# Patient Record
Sex: MALE | Race: WHITE | Employment: FULL TIME | URBAN - METROPOLITAN AREA
[De-identification: names, ages, dates, MRNs, and addresses within clinical notes are randomized per-mention and may not be internally consistent; named-entity substitution may affect disease eponyms.]

---

## 2024-01-10 ENCOUNTER — APPOINTMENT (OUTPATIENT)
Dept: CT IMAGING | Facility: HOSPITAL | Age: 44
End: 2024-01-10
Attending: EMERGENCY MEDICINE
Payer: COMMERCIAL

## 2024-01-10 ENCOUNTER — HOSPITAL ENCOUNTER (EMERGENCY)
Facility: HOSPITAL | Age: 44
Discharge: HOME | End: 2024-01-10
Attending: EMERGENCY MEDICINE
Payer: COMMERCIAL

## 2024-01-10 VITALS
HEART RATE: 90 BPM | OXYGEN SATURATION: 100 % | DIASTOLIC BLOOD PRESSURE: 94 MMHG | RESPIRATION RATE: 15 BRPM | SYSTOLIC BLOOD PRESSURE: 137 MMHG | BODY MASS INDEX: 28.2 KG/M2 | HEIGHT: 73 IN | TEMPERATURE: 97 F | WEIGHT: 212.81 LBS

## 2024-01-10 DIAGNOSIS — N20.0 KIDNEY STONE: Primary | ICD-10-CM

## 2024-01-10 LAB
ALBUMIN SERPL-MCNC: 4.6 G/DL (ref 3.4–5)
ALBUMIN/GLOB SERPL: 1.3 {RATIO} (ref 1–2)
ALP LIVER SERPL-CCNC: 91 U/L
ALT SERPL-CCNC: 39 U/L
ANION GAP SERPL CALC-SCNC: 7 MMOL/L (ref 0–18)
AST SERPL-CCNC: 20 U/L (ref 15–37)
BASOPHILS # BLD AUTO: 0.02 X10(3) UL (ref 0–0.2)
BASOPHILS NFR BLD AUTO: 0.3 %
BILIRUB SERPL-MCNC: 0.6 MG/DL (ref 0.1–2)
BILIRUB UR QL STRIP.AUTO: NEGATIVE
BUN BLD-MCNC: 12 MG/DL (ref 9–23)
CALCIUM BLD-MCNC: 9.6 MG/DL (ref 8.5–10.1)
CHLORIDE SERPL-SCNC: 101 MMOL/L (ref 98–112)
CLARITY UR REFRACT.AUTO: CLEAR
CO2 SERPL-SCNC: 26 MMOL/L (ref 21–32)
COLOR UR AUTO: COLORLESS
CREAT BLD-MCNC: 1.19 MG/DL
EGFRCR SERPLBLD CKD-EPI 2021: 79 ML/MIN/1.73M2 (ref 60–?)
EOSINOPHIL # BLD AUTO: 0.06 X10(3) UL (ref 0–0.7)
EOSINOPHIL NFR BLD AUTO: 1 %
ERYTHROCYTE [DISTWIDTH] IN BLOOD BY AUTOMATED COUNT: 11.6 %
GLOBULIN PLAS-MCNC: 3.6 G/DL (ref 2.8–4.4)
GLUCOSE BLD-MCNC: 104 MG/DL (ref 70–99)
GLUCOSE UR STRIP.AUTO-MCNC: NORMAL MG/DL
HCT VFR BLD AUTO: 40.8 %
HGB BLD-MCNC: 14.5 G/DL
IMM GRANULOCYTES # BLD AUTO: 0.01 X10(3) UL (ref 0–1)
IMM GRANULOCYTES NFR BLD: 0.2 %
KETONES UR STRIP.AUTO-MCNC: 20 MG/DL
LEUKOCYTE ESTERASE UR QL STRIP.AUTO: NEGATIVE
LIPASE SERPL-CCNC: 28 U/L (ref 13–75)
LYMPHOCYTES # BLD AUTO: 1.44 X10(3) UL (ref 1–4)
LYMPHOCYTES NFR BLD AUTO: 24.9 %
MCH RBC QN AUTO: 30.6 PG (ref 26–34)
MCHC RBC AUTO-ENTMCNC: 35.5 G/DL (ref 31–37)
MCV RBC AUTO: 86.1 FL
MONOCYTES # BLD AUTO: 0.62 X10(3) UL (ref 0.1–1)
MONOCYTES NFR BLD AUTO: 10.7 %
NEUTROPHILS # BLD AUTO: 3.63 X10 (3) UL (ref 1.5–7.7)
NEUTROPHILS # BLD AUTO: 3.63 X10(3) UL (ref 1.5–7.7)
NEUTROPHILS NFR BLD AUTO: 62.9 %
NITRITE UR QL STRIP.AUTO: NEGATIVE
OSMOLALITY SERPL CALC.SUM OF ELEC: 278 MOSM/KG (ref 275–295)
PH UR STRIP.AUTO: 5.5 [PH] (ref 5–8)
PLATELET # BLD AUTO: 261 10(3)UL (ref 150–450)
POTASSIUM SERPL-SCNC: 3.6 MMOL/L (ref 3.5–5.1)
PROT SERPL-MCNC: 8.2 G/DL (ref 6.4–8.2)
PROT UR STRIP.AUTO-MCNC: NEGATIVE MG/DL
RBC # BLD AUTO: 4.74 X10(6)UL
RBC UR QL AUTO: NEGATIVE
SODIUM SERPL-SCNC: 134 MMOL/L (ref 136–145)
SP GR UR STRIP.AUTO: 1.01 (ref 1–1.03)
UROBILINOGEN UR STRIP.AUTO-MCNC: NORMAL MG/DL
WBC # BLD AUTO: 5.8 X10(3) UL (ref 4–11)

## 2024-01-10 PROCEDURE — 74177 CT ABD & PELVIS W/CONTRAST: CPT | Performed by: EMERGENCY MEDICINE

## 2024-01-10 PROCEDURE — 80053 COMPREHEN METABOLIC PANEL: CPT | Performed by: EMERGENCY MEDICINE

## 2024-01-10 PROCEDURE — 83690 ASSAY OF LIPASE: CPT | Performed by: EMERGENCY MEDICINE

## 2024-01-10 PROCEDURE — 96374 THER/PROPH/DIAG INJ IV PUSH: CPT

## 2024-01-10 PROCEDURE — 85025 COMPLETE CBC W/AUTO DIFF WBC: CPT | Performed by: EMERGENCY MEDICINE

## 2024-01-10 PROCEDURE — 96375 TX/PRO/DX INJ NEW DRUG ADDON: CPT

## 2024-01-10 PROCEDURE — 99284 EMERGENCY DEPT VISIT MOD MDM: CPT

## 2024-01-10 PROCEDURE — 96376 TX/PRO/DX INJ SAME DRUG ADON: CPT

## 2024-01-10 PROCEDURE — 81003 URINALYSIS AUTO W/O SCOPE: CPT | Performed by: EMERGENCY MEDICINE

## 2024-01-10 PROCEDURE — 96361 HYDRATE IV INFUSION ADD-ON: CPT

## 2024-01-10 PROCEDURE — 99285 EMERGENCY DEPT VISIT HI MDM: CPT

## 2024-01-10 RX ORDER — SODIUM CHLORIDE 9 MG/ML
1000 INJECTION, SOLUTION INTRAVENOUS ONCE
Status: COMPLETED | OUTPATIENT
Start: 2024-01-10 | End: 2024-01-10

## 2024-01-10 RX ORDER — TRAMADOL HYDROCHLORIDE 50 MG/1
TABLET ORAL EVERY 6 HOURS PRN
Qty: 10 TABLET | Refills: 0 | Status: SHIPPED | OUTPATIENT
Start: 2024-01-10 | End: 2024-01-15

## 2024-01-10 RX ORDER — ONDANSETRON 4 MG/1
4 TABLET, ORALLY DISINTEGRATING ORAL EVERY 4 HOURS PRN
Qty: 10 TABLET | Refills: 0 | Status: SHIPPED | OUTPATIENT
Start: 2024-01-10 | End: 2024-01-17

## 2024-01-10 RX ORDER — HYDROMORPHONE HYDROCHLORIDE 1 MG/ML
0.5 INJECTION, SOLUTION INTRAMUSCULAR; INTRAVENOUS; SUBCUTANEOUS ONCE
Status: COMPLETED | OUTPATIENT
Start: 2024-01-10 | End: 2024-01-10

## 2024-01-10 RX ORDER — KETOROLAC TROMETHAMINE 30 MG/ML
30 INJECTION, SOLUTION INTRAMUSCULAR; INTRAVENOUS ONCE
Status: COMPLETED | OUTPATIENT
Start: 2024-01-10 | End: 2024-01-10

## 2024-01-10 RX ORDER — TAMSULOSIN HYDROCHLORIDE 0.4 MG/1
0.4 CAPSULE ORAL DAILY
Qty: 7 CAPSULE | Refills: 0 | Status: SHIPPED | OUTPATIENT
Start: 2024-01-10 | End: 2024-01-17

## 2024-01-10 NOTE — ED PROVIDER NOTES
Patient Seen in: [facility] Emergency Department      History     Chief Complaint   Patient presents with    Abdomen/Flank Pain     Stated Complaint: abd pain    Subjective:   HPI    Patient is a 43-year-old male presents emergency room with history of left-sided flank pain which has been ongoing for the last several days.  Patient never had a kidney stone in the past.  The patient denies history of any vomiting or diarrhea.  The patient denies history of any nausea.  Patient's pain is well localized to the left flank at this time.  The patient denies history of any fever.  Patient has had a previous orchiectomy but no other abdominal surgeries.  Patient denies history of any other somatic complaints or discomfort at this time.    Objective:   History reviewed. No pertinent past medical history.           Past Surgical History:   Procedure Laterality Date    ORCHIECTOMY    10/12/2013    Procedure: ORCHIECTOMY SIMPLE POSSIBLE RADICAL ADULT;  Surgeon: Robbie Mills MD;  Location:  MAIN OR    OTHER SURGICAL HISTORY  2002    wisdom teeth    VASECTOMY  10/12/2013    Procedure: VASECTOMY;  Surgeon: Robbie Mills MD;  Location:  MAIN OR                Social History     Socioeconomic History    Marital status:    Tobacco Use    Smoking status: Never    Smokeless tobacco: Never   Vaping Use    Vaping Use: Never used   Substance and Sexual Activity    Alcohol use: Yes     Comment: SOCIAL    Drug use: No              Review of Systems    Positive for stated complaint: abd pain  Other systems are as noted in HPI.  Constitutional and vital signs reviewed.      All other systems reviewed and negative except as noted above.    Physical Exam     ED Triage Vitals   BP 01/10/24 1012 (!) 164/88   Pulse 01/10/24 1010 99   Resp 01/10/24 1010 18   Temp 01/10/24 1012 97.4 °F (36.3 °C)   Temp src 01/10/24 1012 Temporal   SpO2 01/10/24 1010 99 %   O2 Device 01/10/24 1100 None (Room air)       Current:BP (!) 137/94   Pulse  90   Temp 97.4 °F (36.3 °C) (Temporal)   Resp 15   Ht 185.4 cm (6' 1\")   Wt 96.5 kg   SpO2 100%   BMI 28.08 kg/m²         Physical Exam  GENERAL: Well-developed, well-nourished male sitting up breathing easily in no apparent distress.  Patient is nontoxic in appearance.  HEENT: Head is normocephalic, atraumatic. Pupils are 4 mm equally round and reactive to light. Oropharynx is clear. Mucous membranes are moist.  LUNGS: Clear to auscultation bilaterally with no wheeze. There is good equal air entry bilaterally.  HEART: Regular rate and rhythm. Normal S1, S2 no S3, or S4. No murmur.  ABDOMEN: There is no focal tenderness to palpation appreciated anywhere throughout the abdomen. There is no guarding, no rebound, no mass, and no organomegaly appreciated. There is normoactive bowel sounds. There is left-sided CVA tenderness.  EXTREMITIES: There is no cyanosis, clubbing, or edema appreciated. Pulses are 2+ and equal in all 4 extremities.  NEURO: Patient is awake, alert and oriented to time place and person. Motor strength is 5 over 5 in all 4 extremities. There are no gross motor or sensory deficits appreciated.  Patient answering all questions appropriately.           ED Course     Labs Reviewed   COMP METABOLIC PANEL (14) - Abnormal; Notable for the following components:       Result Value    Glucose 104 (*)     Sodium 134 (*)     All other components within normal limits   URINALYSIS WITH CULTURE REFLEX - Abnormal; Notable for the following components:    Urine Color Colorless (*)     Ketones Urine 20 (*)     All other components within normal limits   LIPASE - Normal   CBC WITH DIFFERENTIAL WITH PLATELET    Narrative:     The following orders were created for panel order CBC With Differential With Platelet.  Procedure                               Abnormality         Status                     ---------                               -----------         ------                     CBC W/ DIFFERENTIAL[[id number]]                               Final result                 Please view results for these tests on the individual orders.   CBC W/ DIFFERENTIAL   I personally reviewed the patient's CT scan of the abdomen pelvis my individual interpretation shows no evidence of any acute bowel obstruction or free air.  I also reviewed the official radiology report which showed results as noted below.          CT ABDOMEN PELVIS IV CONTRAST, NO ORAL (ER)    Result Date: 1/10/2024  CONCLUSION:  1. 0.4 cm left UVJ calculus with mild obstruction. 2. Details as above.  Continued clinical correlation recommended.    LOCATION:  [facility]   Dictated by (CST): Tank Stuart MD on 1/10/2024 at 12:45 PM     Finalized by (CST): Tank Stuart MD on 1/10/2024 at 12:49 PM               MDM      13:45 patient sitting back and breathing easily in no apparent distress this time.  The patient feeling much better at this time.  Will discharge to home at this time.      Patient an IV line established blood work drawn including CBC, chemistries, BUN/creatinine, and blood sugar all of which are unremarkable.  Liver function tests are found be negative.  Lipase found to be negative.  Urinalysis is found to be unremarkable.  Differential diagnosis considered myself includes acute diverticulitis, acute ureterolithiasis, acute pyelonephritis.  Patient does have evidence of a distal 4 mm ureteral stone with mild obstruction.  Patient was given IV pain medication and medication for nausea here in the ER and felt much better after this was given.  Patient had no further new complaints throughout the rest emergency room stay.  Patient was feeling much better on repeat examination and felt comfortable going home.  Patient given prescription for tramadol, Zofran, and Flomax for home.  Instructions to have the patient encourage plenty of fluids and strain all urine at home to return to the ER immediately if symptoms worsen or if any other problems arise.  Patient discharged  home with significant improvement in symptoms and no further complaints.                             Medical Decision Making      Disposition and Plan     Clinical Impression:  1. Kidney stone         Disposition:  Discharge  1/10/2024  1:49 pm    Follow-up:  [facility], [place] [address]  [facility] [id number] [phone]  Follow up in 2 day(s)  please call for follow up as needed          Medications Prescribed:  Current Discharge Medication List        START taking these medications    Details   traMADol 50 MG Oral Tab Take 1-2 tablets ( mg total) by mouth every 6 (six) hours as needed for Pain.  Qty: 10 tablet, Refills: 0    Associated Diagnoses: Kidney stone      ondansetron 4 MG Oral Tablet Dispersible Take 1 tablet (4 mg total) by mouth every 4 (four) hours as needed for Nausea.  Qty: 10 tablet, Refills: 0      tamsulosin (FLOMAX) 0.4 MG Oral Cap Take 1 capsule (0.4 mg total) by mouth daily for 7 days.  Qty: 7 capsule, Refills: 0

## 2024-01-10 NOTE — DISCHARGE INSTRUCTIONS
Encourage fluids at home  Strain all urine at home  Motrin 600 mg every 6 hours for pain at home  Return to the ER if symptoms worsen or if any other problems arise

## 2024-03-04 ENCOUNTER — APPOINTMENT (OUTPATIENT)
Dept: CT IMAGING | Age: 44
End: 2024-03-04
Attending: PHYSICIAN ASSISTANT
Payer: COMMERCIAL

## 2024-03-04 ENCOUNTER — HOSPITAL ENCOUNTER (OUTPATIENT)
Facility: HOSPITAL | Age: 44
Setting detail: OBSERVATION
Discharge: HOME | End: 2024-03-05
Attending: EMERGENCY MEDICINE | Admitting: INTERNAL MEDICINE
Payer: COMMERCIAL

## 2024-03-04 DIAGNOSIS — N23 RENAL COLIC: Primary | ICD-10-CM

## 2024-03-04 DIAGNOSIS — N20.1 URETERAL CALCULI: ICD-10-CM

## 2024-03-04 LAB
BASOPHILS # BLD AUTO: 0.04 X10(3) UL (ref 0–0.2)
BASOPHILS NFR BLD AUTO: 0.4 %
BUN BLD-MCNC: 21 MG/DL (ref 7–18)
CHLORIDE BLD-SCNC: 103 MMOL/L (ref 98–112)
CO2 BLD-SCNC: 26 MMOL/L (ref 21–32)
CREAT BLD-MCNC: 1.2 MG/DL
EGFRCR SERPLBLD CKD-EPI 2021: 77 ML/MIN/1.73M2 (ref 60–?)
EOSINOPHIL # BLD AUTO: 0.16 X10(3) UL (ref 0–0.7)
EOSINOPHIL NFR BLD AUTO: 1.5 %
ERYTHROCYTE [DISTWIDTH] IN BLOOD BY AUTOMATED COUNT: 12 %
GLUCOSE BLD-MCNC: 126 MG/DL (ref 70–99)
HCT VFR BLD AUTO: 39.3 %
HCT VFR BLD CALC: 38 %
HGB BLD-MCNC: 13.7 G/DL
IMM GRANULOCYTES # BLD AUTO: 0.04 X10(3) UL (ref 0–1)
IMM GRANULOCYTES NFR BLD: 0.4 %
ISTAT IONIZED CALCIUM FOR CHEM 8: 1.26 MMOL/L (ref 1.12–1.32)
LYMPHOCYTES # BLD AUTO: 1.52 X10(3) UL (ref 1–4)
LYMPHOCYTES NFR BLD AUTO: 14.6 %
MCH RBC QN AUTO: 31.1 PG (ref 26–34)
MCHC RBC AUTO-ENTMCNC: 34.9 G/DL (ref 31–37)
MCV RBC AUTO: 89.3 FL
MONOCYTES # BLD AUTO: 0.71 X10(3) UL (ref 0.1–1)
MONOCYTES NFR BLD AUTO: 6.8 %
NEUTROPHILS # BLD AUTO: 7.97 X10 (3) UL (ref 1.5–7.7)
NEUTROPHILS # BLD AUTO: 7.97 X10(3) UL (ref 1.5–7.7)
NEUTROPHILS NFR BLD AUTO: 76.3 %
PLATELET # BLD AUTO: 244 10(3)UL (ref 150–450)
POTASSIUM BLD-SCNC: 3.7 MMOL/L (ref 3.6–5.1)
RBC # BLD AUTO: 4.4 X10(6)UL
SODIUM BLD-SCNC: 139 MMOL/L (ref 136–145)
WBC # BLD AUTO: 10.4 X10(3) UL (ref 4–11)

## 2024-03-04 PROCEDURE — 99285 EMERGENCY DEPT VISIT HI MDM: CPT

## 2024-03-04 PROCEDURE — 96374 THER/PROPH/DIAG INJ IV PUSH: CPT

## 2024-03-04 PROCEDURE — 80047 BASIC METABLC PNL IONIZED CA: CPT

## 2024-03-04 PROCEDURE — 74176 CT ABD & PELVIS W/O CONTRAST: CPT | Performed by: PHYSICIAN ASSISTANT

## 2024-03-04 PROCEDURE — 85025 COMPLETE CBC W/AUTO DIFF WBC: CPT | Performed by: PHYSICIAN ASSISTANT

## 2024-03-04 PROCEDURE — 80053 COMPREHEN METABOLIC PANEL: CPT | Performed by: PHYSICIAN ASSISTANT

## 2024-03-04 PROCEDURE — 96375 TX/PRO/DX INJ NEW DRUG ADDON: CPT

## 2024-03-04 PROCEDURE — 96361 HYDRATE IV INFUSION ADD-ON: CPT

## 2024-03-04 RX ORDER — KETOROLAC TROMETHAMINE 30 MG/ML
30 INJECTION, SOLUTION INTRAMUSCULAR; INTRAVENOUS ONCE
Status: COMPLETED | OUTPATIENT
Start: 2024-03-04 | End: 2024-03-04

## 2024-03-04 RX ORDER — HYDROMORPHONE HYDROCHLORIDE 1 MG/ML
1 INJECTION, SOLUTION INTRAMUSCULAR; INTRAVENOUS; SUBCUTANEOUS ONCE
Status: COMPLETED | OUTPATIENT
Start: 2024-03-04 | End: 2024-03-04

## 2024-03-05 ENCOUNTER — ANESTHESIA (OUTPATIENT)
Dept: SURGERY | Facility: HOSPITAL | Age: 44
End: 2024-03-05
Payer: COMMERCIAL

## 2024-03-05 ENCOUNTER — APPOINTMENT (OUTPATIENT)
Dept: GENERAL RADIOLOGY | Facility: HOSPITAL | Age: 44
End: 2024-03-05
Attending: UROLOGY
Payer: COMMERCIAL

## 2024-03-05 ENCOUNTER — ANESTHESIA EVENT (OUTPATIENT)
Dept: SURGERY | Facility: HOSPITAL | Age: 44
End: 2024-03-05
Payer: COMMERCIAL

## 2024-03-05 VITALS
OXYGEN SATURATION: 100 % | WEIGHT: 200 LBS | SYSTOLIC BLOOD PRESSURE: 139 MMHG | RESPIRATION RATE: 12 BRPM | DIASTOLIC BLOOD PRESSURE: 89 MMHG | TEMPERATURE: 98 F | BODY MASS INDEX: 26 KG/M2 | HEART RATE: 63 BPM

## 2024-03-05 PROBLEM — R73.9 HYPERGLYCEMIA: Status: ACTIVE | Noted: 2024-03-05

## 2024-03-05 PROBLEM — N23 RENAL COLIC: Status: ACTIVE | Noted: 2024-03-05

## 2024-03-05 LAB
ALBUMIN SERPL-MCNC: 4.4 G/DL (ref 3.4–5)
ALBUMIN/GLOB SERPL: 1.4 {RATIO} (ref 1–2)
ALP LIVER SERPL-CCNC: 87 U/L
ALT SERPL-CCNC: 41 U/L
ANION GAP SERPL CALC-SCNC: 5 MMOL/L (ref 0–18)
ANION GAP SERPL CALC-SCNC: 6 MMOL/L (ref 0–18)
AST SERPL-CCNC: 22 U/L (ref 15–37)
BASOPHILS # BLD AUTO: 0.01 X10(3) UL (ref 0–0.2)
BASOPHILS NFR BLD AUTO: 0.1 %
BILIRUB SERPL-MCNC: 0.5 MG/DL (ref 0.1–2)
BILIRUB UR QL STRIP.AUTO: NEGATIVE
BUN BLD-MCNC: 14 MG/DL (ref 9–23)
BUN BLD-MCNC: 21 MG/DL (ref 9–23)
CALCIUM BLD-MCNC: 9.2 MG/DL (ref 8.5–10.1)
CALCIUM BLD-MCNC: 9.8 MG/DL (ref 8.5–10.1)
CHLORIDE SERPL-SCNC: 106 MMOL/L (ref 98–112)
CHLORIDE SERPL-SCNC: 112 MMOL/L (ref 98–112)
CLARITY UR REFRACT.AUTO: CLEAR
CO2 SERPL-SCNC: 24 MMOL/L (ref 21–32)
CO2 SERPL-SCNC: 25 MMOL/L (ref 21–32)
COLOR UR AUTO: YELLOW
CREAT BLD-MCNC: 1.02 MG/DL
CREAT BLD-MCNC: 1.17 MG/DL
EGFRCR SERPLBLD CKD-EPI 2021: 80 ML/MIN/1.73M2 (ref 60–?)
EGFRCR SERPLBLD CKD-EPI 2021: 94 ML/MIN/1.73M2 (ref 60–?)
EOSINOPHIL # BLD AUTO: 0.05 X10(3) UL (ref 0–0.7)
EOSINOPHIL NFR BLD AUTO: 0.7 %
ERYTHROCYTE [DISTWIDTH] IN BLOOD BY AUTOMATED COUNT: 11.9 %
GLOBULIN PLAS-MCNC: 3.1 G/DL (ref 2.8–4.4)
GLUCOSE BLD-MCNC: 102 MG/DL (ref 70–99)
GLUCOSE BLD-MCNC: 133 MG/DL (ref 70–99)
GLUCOSE UR STRIP.AUTO-MCNC: NEGATIVE MG/DL
HCT VFR BLD AUTO: 37.7 %
HGB BLD-MCNC: 12.9 G/DL
IMM GRANULOCYTES # BLD AUTO: 0.02 X10(3) UL (ref 0–1)
IMM GRANULOCYTES NFR BLD: 0.3 %
KETONES UR STRIP.AUTO-MCNC: 15 MG/DL
LEUKOCYTE ESTERASE UR QL STRIP.AUTO: NEGATIVE
LYMPHOCYTES # BLD AUTO: 1.52 X10(3) UL (ref 1–4)
LYMPHOCYTES NFR BLD AUTO: 20.1 %
MCH RBC QN AUTO: 30.9 PG (ref 26–34)
MCHC RBC AUTO-ENTMCNC: 34.2 G/DL (ref 31–37)
MCV RBC AUTO: 90.2 FL
MONOCYTES # BLD AUTO: 0.7 X10(3) UL (ref 0.1–1)
MONOCYTES NFR BLD AUTO: 9.3 %
NEUTROPHILS # BLD AUTO: 5.25 X10 (3) UL (ref 1.5–7.7)
NEUTROPHILS # BLD AUTO: 5.25 X10(3) UL (ref 1.5–7.7)
NEUTROPHILS NFR BLD AUTO: 69.5 %
NITRITE UR QL STRIP.AUTO: NEGATIVE
OSMOLALITY SERPL CALC.SUM OF ELEC: 289 MOSM/KG (ref 275–295)
OSMOLALITY SERPL CALC.SUM OF ELEC: 293 MOSM/KG (ref 275–295)
PH UR STRIP.AUTO: 7.5 [PH] (ref 5–8)
PLATELET # BLD AUTO: 225 10(3)UL (ref 150–450)
POTASSIUM SERPL-SCNC: 3.6 MMOL/L (ref 3.5–5.1)
POTASSIUM SERPL-SCNC: 3.9 MMOL/L (ref 3.5–5.1)
PROT SERPL-MCNC: 7.5 G/DL (ref 6.4–8.2)
PROT UR STRIP.AUTO-MCNC: NEGATIVE MG/DL
RBC # BLD AUTO: 4.18 X10(6)UL
RBC UR QL AUTO: NEGATIVE
SODIUM SERPL-SCNC: 137 MMOL/L (ref 136–145)
SODIUM SERPL-SCNC: 141 MMOL/L (ref 136–145)
SP GR UR STRIP.AUTO: 1.02 (ref 1–1.03)
UROBILINOGEN UR STRIP.AUTO-MCNC: 0.2 MG/DL
WBC # BLD AUTO: 7.6 X10(3) UL (ref 4–11)

## 2024-03-05 PROCEDURE — 81003 URINALYSIS AUTO W/O SCOPE: CPT | Performed by: PHYSICIAN ASSISTANT

## 2024-03-05 PROCEDURE — 85025 COMPLETE CBC W/AUTO DIFF WBC: CPT | Performed by: HOSPITALIST

## 2024-03-05 PROCEDURE — 0T778DZ DILATION OF LEFT URETER WITH INTRALUMINAL DEVICE, VIA NATURAL OR ARTIFICIAL OPENING ENDOSCOPIC: ICD-10-PCS | Performed by: UROLOGY

## 2024-03-05 PROCEDURE — 80048 BASIC METABOLIC PNL TOTAL CA: CPT | Performed by: STUDENT IN AN ORGANIZED HEALTH CARE EDUCATION/TRAINING PROGRAM

## 2024-03-05 PROCEDURE — 76000 FLUOROSCOPY <1 HR PHYS/QHP: CPT | Performed by: UROLOGY

## 2024-03-05 PROCEDURE — 96376 TX/PRO/DX INJ SAME DRUG ADON: CPT

## 2024-03-05 PROCEDURE — 88300 SURGICAL PATH GROSS: CPT | Performed by: UROLOGY

## 2024-03-05 PROCEDURE — 82365 CALCULUS SPECTROSCOPY: CPT | Performed by: UROLOGY

## 2024-03-05 PROCEDURE — 0TC78ZZ EXTIRPATION OF MATTER FROM LEFT URETER, VIA NATURAL OR ARTIFICIAL OPENING ENDOSCOPIC: ICD-10-PCS | Performed by: UROLOGY

## 2024-03-05 DEVICE — URETERAL STENT
Type: IMPLANTABLE DEVICE | Site: URETER | Status: FUNCTIONAL
Brand: ASCERTA™

## 2024-03-05 RX ORDER — POLYETHYLENE GLYCOL 3350 17 G/17G
17 POWDER, FOR SOLUTION ORAL DAILY PRN
Status: DISCONTINUED | OUTPATIENT
Start: 2024-03-05 | End: 2024-03-05

## 2024-03-05 RX ORDER — SODIUM CHLORIDE, SODIUM LACTATE, POTASSIUM CHLORIDE, CALCIUM CHLORIDE 600; 310; 30; 20 MG/100ML; MG/100ML; MG/100ML; MG/100ML
INJECTION, SOLUTION INTRAVENOUS CONTINUOUS
Status: DISCONTINUED | OUTPATIENT
Start: 2024-03-05 | End: 2024-03-05

## 2024-03-05 RX ORDER — LIDOCAINE HYDROCHLORIDE 20 MG/ML
JELLY TOPICAL AS NEEDED
Status: DISCONTINUED | OUTPATIENT
Start: 2024-03-05 | End: 2024-03-05 | Stop reason: HOSPADM

## 2024-03-05 RX ORDER — CETIRIZINE HYDROCHLORIDE 5 MG/1
5 TABLET ORAL DAILY
COMMUNITY

## 2024-03-05 RX ORDER — SENNOSIDES 8.6 MG
17.2 TABLET ORAL NIGHTLY PRN
Status: DISCONTINUED | OUTPATIENT
Start: 2024-03-05 | End: 2024-03-05

## 2024-03-05 RX ORDER — PROCHLORPERAZINE EDISYLATE 5 MG/ML
5 INJECTION INTRAMUSCULAR; INTRAVENOUS EVERY 8 HOURS PRN
Status: DISCONTINUED | OUTPATIENT
Start: 2024-03-05 | End: 2024-03-05

## 2024-03-05 RX ORDER — ONDANSETRON 2 MG/ML
INJECTION INTRAMUSCULAR; INTRAVENOUS AS NEEDED
Status: DISCONTINUED | OUTPATIENT
Start: 2024-03-05 | End: 2024-03-05 | Stop reason: SURG

## 2024-03-05 RX ORDER — PHENAZOPYRIDINE HYDROCHLORIDE 200 MG/1
200 TABLET, FILM COATED ORAL 3 TIMES DAILY PRN
Status: DISCONTINUED | OUTPATIENT
Start: 2024-03-05 | End: 2024-03-05

## 2024-03-05 RX ORDER — KETOROLAC TROMETHAMINE 30 MG/ML
INJECTION, SOLUTION INTRAMUSCULAR; INTRAVENOUS AS NEEDED
Status: DISCONTINUED | OUTPATIENT
Start: 2024-03-05 | End: 2024-03-05 | Stop reason: SURG

## 2024-03-05 RX ORDER — ACETAMINOPHEN 500 MG
500 TABLET ORAL EVERY 4 HOURS PRN
Status: DISCONTINUED | OUTPATIENT
Start: 2024-03-05 | End: 2024-03-05

## 2024-03-05 RX ORDER — HYDROMORPHONE HYDROCHLORIDE 1 MG/ML
0.4 INJECTION, SOLUTION INTRAMUSCULAR; INTRAVENOUS; SUBCUTANEOUS EVERY 5 MIN PRN
Status: DISCONTINUED | OUTPATIENT
Start: 2024-03-05 | End: 2024-03-05

## 2024-03-05 RX ORDER — SODIUM CHLORIDE, SODIUM LACTATE, POTASSIUM CHLORIDE, CALCIUM CHLORIDE 600; 310; 30; 20 MG/100ML; MG/100ML; MG/100ML; MG/100ML
INJECTION, SOLUTION INTRAVENOUS CONTINUOUS PRN
Status: DISCONTINUED | OUTPATIENT
Start: 2024-03-05 | End: 2024-03-05 | Stop reason: SURG

## 2024-03-05 RX ORDER — HYDROCODONE BITARTRATE AND ACETAMINOPHEN 5; 325 MG/1; MG/1
1 TABLET ORAL ONCE AS NEEDED
Status: DISCONTINUED | OUTPATIENT
Start: 2024-03-05 | End: 2024-03-05

## 2024-03-05 RX ORDER — HYDROMORPHONE HYDROCHLORIDE 1 MG/ML
0.6 INJECTION, SOLUTION INTRAMUSCULAR; INTRAVENOUS; SUBCUTANEOUS EVERY 5 MIN PRN
Status: DISCONTINUED | OUTPATIENT
Start: 2024-03-05 | End: 2024-03-05

## 2024-03-05 RX ORDER — MORPHINE SULFATE 4 MG/ML
4 INJECTION, SOLUTION INTRAMUSCULAR; INTRAVENOUS EVERY 2 HOUR PRN
Status: DISCONTINUED | OUTPATIENT
Start: 2024-03-05 | End: 2024-03-05

## 2024-03-05 RX ORDER — NALOXONE HYDROCHLORIDE 0.4 MG/ML
80 INJECTION, SOLUTION INTRAMUSCULAR; INTRAVENOUS; SUBCUTANEOUS AS NEEDED
Status: DISCONTINUED | OUTPATIENT
Start: 2024-03-05 | End: 2024-03-05

## 2024-03-05 RX ORDER — PHENAZOPYRIDINE HYDROCHLORIDE 200 MG/1
200 TABLET, FILM COATED ORAL 3 TIMES DAILY PRN
Qty: 21 TABLET | Refills: 0 | Status: SHIPPED | OUTPATIENT
Start: 2024-03-05

## 2024-03-05 RX ORDER — ACETAMINOPHEN 500 MG
1000 TABLET ORAL ONCE AS NEEDED
Status: DISCONTINUED | OUTPATIENT
Start: 2024-03-05 | End: 2024-03-05

## 2024-03-05 RX ORDER — BISACODYL 10 MG
10 SUPPOSITORY, RECTAL RECTAL
Status: DISCONTINUED | OUTPATIENT
Start: 2024-03-05 | End: 2024-03-05

## 2024-03-05 RX ORDER — LEVOFLOXACIN 5 MG/ML
INJECTION, SOLUTION INTRAVENOUS AS NEEDED
Status: DISCONTINUED | OUTPATIENT
Start: 2024-03-05 | End: 2024-03-05 | Stop reason: SURG

## 2024-03-05 RX ORDER — ONDANSETRON 2 MG/ML
4 INJECTION INTRAMUSCULAR; INTRAVENOUS EVERY 6 HOURS PRN
Status: DISCONTINUED | OUTPATIENT
Start: 2024-03-05 | End: 2024-03-05

## 2024-03-05 RX ORDER — GLYCOPYRROLATE 0.2 MG/ML
INJECTION, SOLUTION INTRAMUSCULAR; INTRAVENOUS AS NEEDED
Status: DISCONTINUED | OUTPATIENT
Start: 2024-03-05 | End: 2024-03-05 | Stop reason: SURG

## 2024-03-05 RX ORDER — DIPHENHYDRAMINE HYDROCHLORIDE 50 MG/ML
12.5 INJECTION INTRAMUSCULAR; INTRAVENOUS AS NEEDED
Status: DISCONTINUED | OUTPATIENT
Start: 2024-03-05 | End: 2024-03-05

## 2024-03-05 RX ORDER — ENEMA 19; 7 G/133ML; G/133ML
1 ENEMA RECTAL ONCE AS NEEDED
Status: DISCONTINUED | OUTPATIENT
Start: 2024-03-05 | End: 2024-03-05

## 2024-03-05 RX ORDER — DEXAMETHASONE SODIUM PHOSPHATE 4 MG/ML
VIAL (ML) INJECTION AS NEEDED
Status: DISCONTINUED | OUTPATIENT
Start: 2024-03-05 | End: 2024-03-05 | Stop reason: SURG

## 2024-03-05 RX ORDER — HYDROCODONE BITARTRATE AND ACETAMINOPHEN 5; 325 MG/1; MG/1
2 TABLET ORAL ONCE AS NEEDED
Status: DISCONTINUED | OUTPATIENT
Start: 2024-03-05 | End: 2024-03-05

## 2024-03-05 RX ORDER — MEPERIDINE HYDROCHLORIDE 25 MG/ML
12.5 INJECTION INTRAMUSCULAR; INTRAVENOUS; SUBCUTANEOUS AS NEEDED
Status: DISCONTINUED | OUTPATIENT
Start: 2024-03-05 | End: 2024-03-05

## 2024-03-05 RX ORDER — HYDROMORPHONE HYDROCHLORIDE 1 MG/ML
1 INJECTION, SOLUTION INTRAMUSCULAR; INTRAVENOUS; SUBCUTANEOUS ONCE
Status: COMPLETED | OUTPATIENT
Start: 2024-03-05 | End: 2024-03-05

## 2024-03-05 RX ORDER — TAMSULOSIN HYDROCHLORIDE 0.4 MG/1
0.4 CAPSULE ORAL
Status: DISCONTINUED | OUTPATIENT
Start: 2024-03-05 | End: 2024-03-05

## 2024-03-05 RX ORDER — LABETALOL HYDROCHLORIDE 5 MG/ML
5 INJECTION, SOLUTION INTRAVENOUS EVERY 5 MIN PRN
Status: DISCONTINUED | OUTPATIENT
Start: 2024-03-05 | End: 2024-03-05

## 2024-03-05 RX ORDER — SODIUM CHLORIDE 9 MG/ML
INJECTION, SOLUTION INTRAVENOUS CONTINUOUS
Status: DISCONTINUED | OUTPATIENT
Start: 2024-03-05 | End: 2024-03-05

## 2024-03-05 RX ORDER — MORPHINE SULFATE 4 MG/ML
2 INJECTION, SOLUTION INTRAMUSCULAR; INTRAVENOUS EVERY 2 HOUR PRN
Status: DISCONTINUED | OUTPATIENT
Start: 2024-03-05 | End: 2024-03-05

## 2024-03-05 RX ORDER — MORPHINE SULFATE 4 MG/ML
1 INJECTION, SOLUTION INTRAMUSCULAR; INTRAVENOUS EVERY 2 HOUR PRN
Status: DISCONTINUED | OUTPATIENT
Start: 2024-03-05 | End: 2024-03-05

## 2024-03-05 RX ORDER — LIDOCAINE HYDROCHLORIDE 10 MG/ML
INJECTION, SOLUTION EPIDURAL; INFILTRATION; INTRACAUDAL; PERINEURAL AS NEEDED
Status: DISCONTINUED | OUTPATIENT
Start: 2024-03-05 | End: 2024-03-05 | Stop reason: SURG

## 2024-03-05 RX ORDER — HYDROMORPHONE HYDROCHLORIDE 1 MG/ML
0.2 INJECTION, SOLUTION INTRAMUSCULAR; INTRAVENOUS; SUBCUTANEOUS EVERY 5 MIN PRN
Status: DISCONTINUED | OUTPATIENT
Start: 2024-03-05 | End: 2024-03-05

## 2024-03-05 RX ADMIN — ONDANSETRON 4 MG: 2 INJECTION INTRAMUSCULAR; INTRAVENOUS at 19:09:00

## 2024-03-05 RX ADMIN — LIDOCAINE HYDROCHLORIDE 100 MG: 10 INJECTION, SOLUTION EPIDURAL; INFILTRATION; INTRACAUDAL; PERINEURAL at 18:55:00

## 2024-03-05 RX ADMIN — KETOROLAC TROMETHAMINE 30 MG: 30 INJECTION, SOLUTION INTRAMUSCULAR; INTRAVENOUS at 19:37:00

## 2024-03-05 RX ADMIN — SODIUM CHLORIDE, SODIUM LACTATE, POTASSIUM CHLORIDE, CALCIUM CHLORIDE: 600; 310; 30; 20 INJECTION, SOLUTION INTRAVENOUS at 18:49:00

## 2024-03-05 RX ADMIN — GLYCOPYRROLATE 0.3 MG: 0.2 INJECTION, SOLUTION INTRAMUSCULAR; INTRAVENOUS at 19:09:00

## 2024-03-05 RX ADMIN — LEVOFLOXACIN 500 MG: 5 INJECTION, SOLUTION INTRAVENOUS at 18:56:00

## 2024-03-05 RX ADMIN — DEXAMETHASONE SODIUM PHOSPHATE 4 MG: 4 MG/ML VIAL (ML) INJECTION at 19:09:00

## 2024-03-05 NOTE — PLAN OF CARE
NURSING ADMISSION NOTE      Patient admitted via Cart  Oriented to room.  Safety precautions initiated.  Bed in low position.  Call light in reach.    Assumed care of pt around 0530.   A/o x4. RA.   Denies any pain since being admitted to the floor.   Urine strained, no stone noted.   IVF infusing.  Resting in bed w/ call light within reach.   Plan for urology to see pt today.

## 2024-03-05 NOTE — CONSULTS
[facility]   part of [facility]    Report of Consultation    Juan Antonio Andresw Patient Status:  Observation    1980 MRN [id number]   Location [facility] 2SW-S Attending Christian Weber MD   Hosp Day # 0 PCP Yun Marshall DO     Date of Admission:  3/4/2024  Date of Consult:  3/5/24  Reason for Consultation:   Ureter stone    History of Present Illness:   Patient is a 44 year old male who was admitted to the hospital for Renal colic:  43 y/o seen in the ER 1/10/2024 diagnosed with a 4mm distal stone, sent home on tamsulosin.   No visible passage but has been completely pain free until an abrupt onset of recurrent left flank pain last night. Repeat imaging proves stone is still present   This morning he is again completely pain free without visible passage in strainer.   No fever, no dysuria, u/a negative  Wbc 7.6, creat 1.17    CT 3/4/2024        Impression   CONCLUSION:    1. 5 mm obstructing calculus at the left ureteral vesicular junction with mild upstream hydroureteronephrosis.  Nonspecific mild left perinephric fat stranding, underlying infection not excluded.  2. Mild circumferential urinary bladder wall thickening, correlate for cystitis.          CT 1/10/2024    CONCLUSION:    1. 0.4 cm left UVJ calculus with mild obstruction.  2. Details as above.  Continued clinical correlation recommended.         Past Medical History  History reviewed. No pertinent past medical history.    Past Surgical History  Past Surgical History:   Procedure Laterality Date    ORCHIECTOMY    10/12/2013    Procedure: ORCHIECTOMY SIMPLE POSSIBLE RADICAL ADULT;  Surgeon: Robbie Mills MD;  Location:  MAIN OR    OTHER SURGICAL HISTORY  2002    wisdom teeth    VASECTOMY  10/12/2013    Procedure: VASECTOMY;  Surgeon: Robbie Mills MD;  Location:  MAIN OR       Family History  Family History   Problem Relation Age of Onset    Lipids Father     Psychiatric Father         panic    Cancer Maternal  Grandfather         prostate       Social History  Social History     Socioeconomic History    Marital status:    Tobacco Use    Smoking status: Never    Smokeless tobacco: Never   Vaping Use    Vaping Use: Never used   Substance and Sexual Activity    Alcohol use: Yes     Alcohol/week: 4.0 standard drinks of alcohol     Types: 4 Standard drinks or equivalent per week     Comment: SOCIAL    Drug use: No     Social Determinants of Health     Food Insecurity: No Food Insecurity (3/5/2024)    Food Insecurity     Food Insecurity: Never true   Transportation Needs: No Transportation Needs (3/5/2024)    Transportation Needs     Lack of Transportation: No   Housing Stability: Low Risk  (3/5/2024)    Housing Stability     Housing Instability: No        Current Medications:  Current Facility-Administered Medications   Medication Dose Route Frequency    sodium chloride 0.9% infusion   Intravenous Continuous    acetaminophen (Tylenol Extra Strength) tab 500 mg  500 mg Oral Q4H PRN    morphINE PF 4 MG/ML injection 1 mg  1 mg Intravenous Q2H PRN    Or    morphINE PF 4 MG/ML injection 2 mg  2 mg Intravenous Q2H PRN    Or    morphINE PF 4 MG/ML injection 4 mg  4 mg Intravenous Q2H PRN    ondansetron (Zofran) 4 MG/2ML injection 4 mg  4 mg Intravenous Q6H PRN    prochlorperazine (Compazine) 10 MG/2ML injection 5 mg  5 mg Intravenous Q8H PRN    polyethylene glycol (PEG 3350) (Miralax) 17 g oral packet 17 g  17 g Oral Daily PRN    sennosides (Senokot) tab 17.2 mg  17.2 mg Oral Nightly PRN    bisacodyl (Dulcolax) 10 MG rectal suppository 10 mg  10 mg Rectal Daily PRN    fleet enema (Fleet) 7-19 GM/118ML rectal enema 133 mL  1 enema Rectal Once PRN    tamsulosin (Flomax) cap 0.4 mg  0.4 mg Oral Daily @ 0700     Medications Prior to Admission   Medication Sig    cetirizine 5 MG Oral Tab Take 1 tablet (5 mg total) by mouth daily.    [] traMADol 50 MG Oral Tab Take 1-2 tablets ( mg total) by mouth every 6 (six) hours as  needed for Pain.    [] ondansetron 4 MG Oral Tablet Dispersible Take 1 tablet (4 mg total) by mouth every 4 (four) hours as needed for Nausea.    [] tamsulosin (FLOMAX) 0.4 MG Oral Cap Take 1 capsule (0.4 mg total) by mouth daily for 7 days.       Allergies  Allergies   Allergen Reactions    Penicillins      Mom and sister are allergic    Seasonal UNKNOWN    Amoxicillin RASH       Review of Systems:    A comprehensive review of systems was negative.    Physical Exam:   Blood pressure 132/82, pulse 60, temperature 97.8 °F (36.6 °C), temperature source Oral, resp. rate 18, weight 200 lb (90.7 kg), SpO2 98%.  GENERAL APPEARANCE: a well-developed, well-nourished, in no apparent distress.   PSYCH: A&O x 3  LUNGS: non labored breathing  HEENT: NC/AT, EOMI, trachea midline, normal external ears, nares patent  ABDOMEN: soft, no masses/HSM, no tenderness  CVA: no CVA tenderness   exam deferred until time of cystoscopy  LE: No clubbing/cyanosis/edema    Results:     Laboratory Data:  Lab Results   Component Value Date    WBC 10.4 2024    HGB 13.7 2024    HCT 39.3 2024    .0 2024    CREATSERUM 1.17 2024    BUN 21 2024     2024    K 3.6 2024     2024    CO2 25.0 2024     (H) 2024    CA 9.8 2024    ALB 4.4 2024    ALKPHO 87 2024    TP 7.5 2024    AST 22 2024    ALT 41 2024    TSH 1.494 2019    LIP 28 01/10/2024         Imaging:  CT ABDOMEN+PELVIS KIDNEYSTONE 2D RNDR(NO IV,NO ORAL)(CPT=74176)    Result Date: 3/4/2024  CONCLUSION:  1. 5 mm obstructing calculus at the left ureteral vesicular junction with mild upstream hydroureteronephrosis.  Nonspecific mild left perinephric fat stranding, underlying infection not excluded. 2. Mild circumferential urinary bladder wall thickening, correlate for cystitis.    LOCATION:  [facility]   Dictated by (CST): Gene Hubbard MD on 3/04/2024 at 11:21  PM     Finalized by (CST): Gene Hubbard MD on 3/04/2024 at 11:23 PM           Impression:   Renal colic  LEFT URETER STONE since 1/10/2024  HYDROURETERONEPHROSIS  FLANK PAIN -controlled     Recommendations:    Despite pain resolution, recommend surgical intervention as his stone has not passed spontaneously in over 7 weeks     OR today for cysto, Ureteroscopy, laser litho, possible stent  Continue NPO  Strain all urine  Hopeful home after procedure tonight    Surgical risks, benefits and alternatives discussed.  Risks of ureteroscopy including but not limited to infection, bleeding, perforation, possible need for stent, need for subsequent removal of stent, ureteral spasm, ureteral injury or stricture discussed with patient along with risks of anesthesia which could include death.          Thank you for allowing me to participate in the care of your patient.    Hillary Phillips PA-C  3/5/2024    [facility] Urologist  Agree with above  Patient seen and examined in preop   Imaging and labs reviewed  Discussed surgical plan as above    Patient verbalized understanding and all questions answered.  He would like to proceed with surgery as planned. LEFT side was confirmed and marked.    Eugene Burch D.O.  [facility]

## 2024-03-05 NOTE — H&P
.CC:   Chief Complaint   Patient presents with    Abdomen/Flank Pain     L flank pain x 2 hours, h/o kidney stone 2 months ago        PCP: Yun Marshall DO    History of Present Illness: Patient is a 44 year old male with PMH sig for acute L flank pain that started yesterday. No n/v  Similar episode 6 weeks prior but this resolved on its own. Since coming in again to ER last night, pain has resolved and he is currently pain free.         PMH  History reviewed. No pertinent past medical history.     PSH  Past Surgical History:   Procedure Laterality Date    ORCHIECTOMY    10/12/2013    Procedure: ORCHIECTOMY SIMPLE POSSIBLE RADICAL ADULT;  Surgeon: Robbie Mills MD;  Location:  MAIN OR    OTHER SURGICAL HISTORY  2002    wisdom teeth    VASECTOMY  10/12/2013    Procedure: VASECTOMY;  Surgeon: Robbie Mills MD;  Location:  MAIN OR        ALL:  Allergies   Allergen Reactions    Penicillins      Mom and sister are allergic    Seasonal UNKNOWN    Amoxicillin RASH        Home Medications:  Outpatient Medications Marked as Taking for the 3/4/24 encounter (Hospital Encounter)   Medication Sig Dispense Refill    cetirizine 5 MG Oral Tab Take 1 tablet (5 mg total) by mouth daily.           Soc Hx  Social History     Tobacco Use    Smoking status: Never    Smokeless tobacco: Never   Substance Use Topics    Alcohol use: Yes     Alcohol/week: 4.0 standard drinks of alcohol     Types: 4 Standard drinks or equivalent per week     Comment: SOCIAL        Fam Hx  Family History   Problem Relation Age of Onset    Lipids Father     Psychiatric Father         panic    Cancer Maternal Grandfather         prostate       Review of Systems  Comprehensive ROS reviewed and negative except for what's stated above.       OBJECTIVE:  /82 (BP Location: Right arm)   Pulse 60   Temp 97.8 °F (36.6 °C) (Oral)   Resp 18   Wt 200 lb (90.7 kg)   SpO2 98%   BMI 26.39 kg/m²     Gen- NAD, appears stated age  HEENT- NCAT, anicteric sclera,  MMM, OP clear  Lymph- no cervical LAD  CV- RRR no murmurs. No JAIR  Lungs- CTAB, good respiratory effort  Abd- soft, ntnd, no organomegaly, BS+  Derm- no rashes  MSK- good muscle strength and tone, no joint swelling  Neuro- A&OX3, no focal deficits      Diagnostic Data:    CBC/Chem  Recent Labs   Lab 03/04/24  2245 03/05/24  0825   WBC 10.4 7.6   HGB 13.7 12.9*   MCV 89.3 90.2   .0 225.0       Recent Labs   Lab 03/04/24  2245      K 3.6      CO2 25.0   BUN 21   CREATSERUM 1.17   *   CA 9.8       Recent Labs   Lab 03/04/24 2245   ALT 41   AST 22   ALB 4.4       No results for input(s): \"TROP\" in the last 168 hours.      Radiology: CT ABDOMEN+PELVIS KIDNEYSTONE 2D RNDR(NO IV,NO ORAL)(CPT=74176)    Result Date: 3/4/2024  PROCEDURE:  CT ABDOMEN+PELVIS KIDNEYSTONE 2D RNDR(NO IV,NO ORAL)(CPT=74176)  COMPARISON:  [facility] , CT, CT ABDOMEN PELVIS IV CONTRAST, NO ORAL (ER), 1/10/2024, 12:23 PM.  INDICATIONS:  flank pain  TECHNIQUE:  Unenhanced multislice CT scanning from above the kidneys to below the urinary bladder.  2D rendering are generated on the CT scanner workstation to localize potential stones in the cranio-caudal plane.  Dose reduction techniques were used. Dose information is transmitted to the ACR (American College of Radiology) NRDR (National Radiology Data Registry) which includes the Dose Index Registry.  PATIENT STATED HISTORY: (As transcribed by Technologist)  flank pain left side, hx kidney stone, 6 wks ago had stone, did not catch in mesh per pt    FINDINGS:  KIDNEYS:  There is a 5 mm calculus at the left ureteral vesicular junction with mild upstream hydroureteronephrosis.  Mild nonspecific left perinephric fat stranding.  No right renal calculus or hydronephrosis. BLADDER:  Mild circumferential bladder wall thickening. ADRENALS:  No mass or enlargement.  LIVER:  No enlargement, atrophy, abnormal density, or significant focal lesion.  BILIARY:  No visible dilatation or  calcification.  PANCREAS:  No lesion, fluid collection, ductal dilatation, or atrophy.  SPLEEN:  No enlargement or focal lesion.  AORTA/VASCULAR:  No aneurysm.  RETROPERITONEUM:  No mass or adenopathy.  BOWEL/MESENTERY:  No visible mass, obstruction, or bowel wall thickening.  ABDOMINAL WALL:  No mass or hernia.  BONES:  No bony lesion or fracture. PELVIC ORGANS:  Normal for age.  LUNG BASES:  No visible pulmonary or pleural disease.  OTHER:  Negative.             CONCLUSION:  1. 5 mm obstructing calculus at the left ureteral vesicular junction with mild upstream hydroureteronephrosis.  Nonspecific mild left perinephric fat stranding, underlying infection not excluded. 2. Mild circumferential urinary bladder wall thickening, correlate for cystitis.    LOCATION:  [facility]   Dictated by (CST): Gene Hubbard MD on 3/04/2024 at 11:21 PM     Finalized by (CST): Gene Hubbard MD on 3/04/2024 at 11:23 PM          Available outpatient records reviewed--    ASSESSMENT / PLAN:     43 yo man with known L ureteral stone that has resulted in recurrent pain and mild hydroureteronephrosis.    - urology consulted, planning cysto, ureteroscopy, laser litho, possible stent today  - npo, prn pain meds until then  - pending timing of surgery and recovery, may dc home later tonight.        Spring Weston MD  [facility] Hospitalist  Pager [phone]  Answering Service number: [phone]

## 2024-03-05 NOTE — ED QUICK NOTES
Orders for admission, patient is aware of plan and ready to go upstairs. Any questions, please call ED RN Albert at extension [phone].     Patient Covid vaccination status: Unvaccinated     COVID Test Ordered in ED: None    COVID Suspicion at Admission: N/A    Running Infusions:      Mental Status/LOC at time of transport: Aaox4      Other pertinent information:   CIWA score: N/A   NIH score:  N/A

## 2024-03-05 NOTE — PLAN OF CARE
Assumed care of patient at 0700  Denies chest pain, sob, lightheadedness, dizziness.   No complaints of pain at this time and resting comfortably in bed.         Problem: Patient/Family Goals  Goal: Patient/Family Long Term Goal  Description: Patient's Long Term Goal:     Interventions:  -   - See additional Care Plan goals for specific interventions  Outcome: Progressing  Goal: Patient/Family Short Term Goal  Description: Patient's Short Term Goal:     Interventions:   -   - See additional Care Plan goals for specific interventions  Outcome: Progressing     Problem: GENITOURINARY - ADULT  Goal: Absence of urinary retention  Description: INTERVENTIONS:  - Assess patient’s ability to void and empty bladder  - Monitor intake/output and perform bladder scan as needed  - Follow urinary retention protocol/standard of care  - Consider collaborating with pharmacy to review patient's medication profile  - Implement strategies to promote bladder emptying  Outcome: Progressing

## 2024-03-05 NOTE — ED PROVIDER NOTES
Patient Seen in: [facility] [place]      History     Chief Complaint   Patient presents with    Abdomen/Flank Pain     L flank pain x 2 hours, h/o kidney stone 2 months ago     Stated Complaint: flank pain    Subjective:   HPI    44-year-old gentleman.  2 hours prior to arrival, patient had onset of acute left flank pain.  He was evaluated for similar pain 6 weeks prior to arrival.  Confirmed renal calculi.  That was the patient's first episode.  Admits that he never followed up with urology.  He did attempt to catch the stone, with a strainer, but this was unsuccessful.  Normal recent health.  No obvious urinary symptoms.  No changes to bowel habits.  No fever or chills.  Currently rates his pain as a 9 out of 10    Objective:   No pertinent past medical history.            No pertinent past surgical history.              No pertinent social history.            Review of Systems    Positive for stated complaint: flank pain  Other systems are as noted in HPI.  Constitutional and vital signs reviewed.      All other systems reviewed and negative except as noted above.    Physical Exam     ED Triage Vitals   BP    Pulse    Resp    Temp    Temp src    SpO2    O2 Device        Current:There were no vitals taken for this visit.        Physical Exam    Gen: Well appearing, well groomed, alert and aware x 3  Neck: Supple, full range of motion, no thyromegaly or lymphadenopathy.  Abdominal: Soft exam without distention.  No pain to palpation all 4 quadrants. No organomegaly.  Normal bowel sounds.  Back: Full active range of motion.  Left CVA tenderness  Lung: No distress, RR, no retraction, breath sounds are clear bilaterally  Cardio: Regular rate and rhythm, normal S1-S2, no murmur appreciable    ED Course     Labs Reviewed   POCT ISTAT CHEM8 CARTRIDGE - Abnormal; Notable for the following components:       Result Value    ISTAT BUN 21 (*)     ISTAT Glucose 126 (*)     All other components within  normal limits   CBC W/ DIFFERENTIAL - Abnormal; Notable for the following components:    Neutrophil Absolute Prelim 7.97 (*)     Neutrophil Absolute 7.97 (*)     All other components within normal limits   CBC WITH DIFFERENTIAL WITH PLATELET    Narrative:     The following orders were created for panel order CBC With Differential With Platelet.  Procedure                               Abnormality         Status                     ---------                               -----------         ------                     CBC W/ DIFFERENTIAL[[id number]]          Abnormal            Final result                 Please view results for these tests on the individual orders.   COMP METABOLIC PANEL (14)   URINALYSIS, ROUTINE                      MDM            Patient in obvious discomfort.  Had a ureteral stone in 6 weeks prior to arrival.  Did not need direct intervention.  Did not follow-up with urology.    CBC, CMP, UA.  Fluid bolus.  IV analgesics.  CT following kidney stone protocol    My supervising physician was involved in the management of this patient.    Impression:    CONCLUSION:    1. 5 mm obstructing calculus at the left ureteral vesicular junction with mild upstream hydroureteronephrosis.  Nonspecific mild left perinephric fat stranding, underlying infection not excluded.  2. Mild circumferential urinary bladder wall thickening, correlate for cystitis.                CBC demonstrates no leukocytosis.  CMP demonstrates normal renal function.    CT as above.  Patient needing further medication.  Awaiting urinalysis.    Patient signed out to supervising physician.  Please see his documentation for final disposition                         Medical Decision Making      Disposition and Plan     Clinical Impression:  No diagnosis found.     Disposition:  There is no disposition on file for this visit.  There is no disposition time on file for this visit.    Follow-up:  No follow-up provider  specified.        Medications Prescribed:  Current Discharge Medication List

## 2024-03-06 NOTE — ANESTHESIA POSTPROCEDURE EVALUATION
[facility]    Juan Antonio Andrews Patient Status:  Observation   Age/Gender 44 year old male MRN [id number]   Location [facility] POST ANESTHESIA CARE UNIT Attending Spring Weston MD   Hosp Day # 0 PCP Yun Marshall DO       Anesthesia Post-op Note    CYSTOSCOPY LEFT URETEROSCOPY, LASER LITHOTRIPSY AND LEFT URETERAL STENT PLACEMENT    Procedure Summary       Date: 03/05/24 Room / Location:  MAIN OR 14 / [facility] MAIN OR    Anesthesia Start: 1849 Anesthesia Stop: 1949    Procedure: CYSTOSCOPY LEFT URETEROSCOPY, LASER LITHOTRIPSY AND LEFT URETERAL STENT PLACEMENT (Left: Ureter) Diagnosis:       Ureteral calculi      (Ureteral calculi [N20.1])    Surgeons: Eugene Burch DO Anesthesiologist: Tank Pereira MD    Anesthesia Type: general ASA Status: 1            Anesthesia Type: general    Vitals Value Taken Time   /87 03/05/24 2019   Temp 98.2 °F (36.8 °C) 03/05/24 1950   Pulse 79 03/05/24 2032   Resp 14 03/05/24 2032   SpO2 100 % 03/05/24 2032   Vitals shown include unfiled device data.    Patient Location: PACU    Anesthesia Type: general    Airway Patency: patent    Postop Pain Control: adequate    Mental Status: preanesthetic baseline    Nausea/Vomiting: none    Cardiopulmonary/Hydration status: stable euvolemic    Complications: no apparent anesthesia related complications    Postop vital signs: stable    Dental Exam: Unchanged from Preop    Patient to be discharged from PACU when criteria met.

## 2024-03-06 NOTE — ANESTHESIA PREPROCEDURE EVALUATION
PRE-OP EVALUATION    Patient Name: Juan Antonio Andrews    Admit Diagnosis: Renal colic [N23]    Pre-op Diagnosis: Ureteral calculi [N20.1]    CYSTOSCOPY LEFT URETEROSCOPY, LASER LITHOTRIPSY AND POSSIBLE LEFT URETERAL STENT PLACEMENT    Anesthesia Procedure: CYSTOSCOPY LEFT URETEROSCOPY, LASER LITHOTRIPSY AND POSSIBLE LEFT URETERAL STENT PLACEMENT (Left)    Surgeon(s) and Role:     * Eugene Burch, DO - Primary    Pre-op vitals reviewed.  Temp: 98.3 °F (36.8 °C)  Pulse: 68  Resp: 17  BP: 130/77  SpO2: 100 %  Body mass index is 26.39 kg/m².    Current medications reviewed.  Hospital Medications:   [COMPLETED] HYDROmorphone (Dilaudid) 1 MG/ML injection 1 mg  1 mg Intravenous Once    sodium chloride 0.9% infusion   Intravenous Continuous    [MAR Hold] acetaminophen (Tylenol Extra Strength) tab 500 mg  500 mg Oral Q4H PRN    [MAR Hold] morphINE PF 4 MG/ML injection 1 mg  1 mg Intravenous Q2H PRN    Or    [MAR Hold] morphINE PF 4 MG/ML injection 2 mg  2 mg Intravenous Q2H PRN    Or    [MAR Hold] morphINE PF 4 MG/ML injection 4 mg  4 mg Intravenous Q2H PRN    [MAR Hold] ondansetron (Zofran) 4 MG/2ML injection 4 mg  4 mg Intravenous Q6H PRN    [MAR Hold] prochlorperazine (Compazine) 10 MG/2ML injection 5 mg  5 mg Intravenous Q8H PRN    [MAR Hold] polyethylene glycol (PEG 3350) (Miralax) 17 g oral packet 17 g  17 g Oral Daily PRN    [MAR Hold] sennosides (Senokot) tab 17.2 mg  17.2 mg Oral Nightly PRN    [MAR Hold] bisacodyl (Dulcolax) 10 MG rectal suppository 10 mg  10 mg Rectal Daily PRN    [MAR Hold] fleet enema (Fleet) 7-19 GM/118ML rectal enema 133 mL  1 enema Rectal Once PRN    [MAR Hold] tamsulosin (Flomax) cap 0.4 mg  0.4 mg Oral Daily @ 0700    [COMPLETED] sodium chloride 0.9 % IV bolus 1,000 mL  1,000 mL Intravenous Once    [COMPLETED] HYDROmorphone (Dilaudid) 1 MG/ML injection 1 mg  1 mg Intravenous Once    [COMPLETED] ketorolac (Toradol) 30 MG/ML injection 30 mg  30 mg Intravenous Once    [COMPLETED]  sodium chloride 0.9 % IV bolus 1,000 mL  1,000 mL Intravenous Once       Outpatient Medications:     Medications Prior to Admission   Medication Sig Dispense Refill Last Dose    cetirizine 5 MG Oral Tab Take 1 tablet (5 mg total) by mouth daily.   3/4/2024 at 0320    [] traMADol 50 MG Oral Tab Take 1-2 tablets ( mg total) by mouth every 6 (six) hours as needed for Pain. 10 tablet 0     [] ondansetron 4 MG Oral Tablet Dispersible Take 1 tablet (4 mg total) by mouth every 4 (four) hours as needed for Nausea. 10 tablet 0     [] tamsulosin (FLOMAX) 0.4 MG Oral Cap Take 1 capsule (0.4 mg total) by mouth daily for 7 days. 7 capsule 0        Allergies: Amoxicillin and Penicillins      Anesthesia Evaluation    Patient summary reviewed.    Anesthetic Complications  (-) history of anesthetic complications         GI/Hepatic/Renal  Comment: Kidney stone                               Cardiovascular    Negative cardiovascular ROS.    Exercise tolerance: good     MET: >4                             (-) angina     (-) SELBY  (-) orthopnea  (-) PND     Endo/Other    Negative endo/other ROS.                              Pulmonary    Negative pulmonary ROS.                       Neuro/Psych    Negative neuro/psych ROS.                                  Past Surgical History:   Procedure Laterality Date    ORCHIECTOMY    10/12/2013    Procedure: ORCHIECTOMY SIMPLE POSSIBLE RADICAL ADULT;  Surgeon: Robbie Mills MD;  Location:  MAIN OR    OTHER SURGICAL HISTORY  2002    wisdom teeth    VASECTOMY  10/12/2013    Procedure: VASECTOMY;  Surgeon: Robbie Mills MD;  Location:  MAIN OR     Social History     Socioeconomic History    Marital status:    Tobacco Use    Smoking status: Never    Smokeless tobacco: Never   Vaping Use    Vaping Use: Never used   Substance and Sexual Activity    Alcohol use: Yes     Alcohol/week: 4.0 standard drinks of alcohol     Types: 4 Standard drinks or equivalent per week      Comment: SOCIAL    Drug use: No     History   Drug Use No     Available pre-op labs reviewed.  Lab Results   Component Value Date    WBC 7.6 03/05/2024    RBC 4.18 (L) 03/05/2024    HGB 12.9 (L) 03/05/2024    HCT 37.7 (L) 03/05/2024    MCV 90.2 03/05/2024    MCH 30.9 03/05/2024    MCHC 34.2 03/05/2024    RDW 11.9 03/05/2024    .0 03/05/2024     Lab Results   Component Value Date     03/05/2024    K 3.9 03/05/2024     03/05/2024    CO2 24.0 03/05/2024    BUN 14 03/05/2024    CREATSERUM 1.02 03/05/2024     (H) 03/05/2024    CA 9.2 03/05/2024            Airway      Mallampati: I  Mouth opening: >3 FB  TM distance: 4 - 6 cm  Neck ROM: full Cardiovascular    Cardiovascular exam normal.  Rhythm: regular  Rate: normal  (-) murmur   Dental    Dentition appears grossly intact         Pulmonary    Pulmonary exam normal.  Breath sounds clear to auscultation bilaterally.        (-) wheezes       Other findings              ASA: 1   Plan: general  NPO status verified and patient meets guidelines.          Plan/risks discussed with: patient              We discussed GA w/LMA or TT and possible scratchy throat and rarely dental damage.  We discussed analgesic plan and PONV prophylaxis.  The patient's questions were answered and consent was attained.

## 2024-03-06 NOTE — OPERATIVE REPORT
[facility]  3/5/2024  Juan Antonio Reillyham  1/27/1980  [id number]       PREOPERATIVE DIAGNOSIS:  Left distal calculus.     POSTOPERATIVE DIAGNOSIS:  Left distal ureteral calculus.     PROCEDURE PERFORMED:       Cystoscopy, left retrograde pyelogram, left ureteroscopy with laser lithotripsy and stone extraction, left ureteral stent placement.  Intraoperative interpretation of fluoroscopy.     SURGEON:  Eugene Burch DO.     ANESTHESIA:  General.     ESTIMATED BLOOD LOSS:  None.     SPECIMENS:  Left ureteral calculus fragments.    FINDINGS: A 5 mm left ureteral calculus with mild left hydronephrosis     DRAINS:  4.8-Micronesian x 28 cm left ureteral stent.     COMPLICATIONS:  None.     PROCEDURE SUMMARY:  After the appropriate informed consent was obtained and in the chart, the patient was given preoperative antibiotics, taken to the cystoscopy suite, placed on the cystoscopy table in supine position. After bilateral sequential compression devices had been applied and satisfactory general anesthesia had been achieved, the patient's legs were raised to a dorsal lithotomy position. The patient's groin was prepped and draped in the usual sterile fashion. A well-lubricated 22-Micronesian rigid cystoscope was then introduced through a normal anterior male urethra, through a prostatic fossa demonstrative of no obstruction, and into the bladder. Upon entering the bladder, the bilateral ureteral orifices were seen in normal expected anatomic position. The bladder was drained and refilled with sterile irrigant. Systematic examination of the bladder showed no signs of foreign body, no extrinsic pressure defect, no neoplasm, no diverticulum, and no trabeculation. A 5-Micronesian open-ended ureteral catheter was then used to intubate the left ureteral orifice, and retrograde pyelogram was performed showing a filling defect in the distal ureter, consistent with approximately 5 mm calculus. There was mild left hydronephrosis. A 0.035  inch Glidewire was passed through the ureteral catheter, coiled within the left renal pelvis, and the cystoscope and ureteral catheter were removed. A 6-10 Burundian [place] dilator was then passed over the Glidewire, and was used to dilate the left ureteral orifice under fluoroscopic guidance. The Glidewire was then affixed to the drapes and used as a safety wire throughout the case. A well-lubricated semirigid ureteroscope was then passed into the bladder and into the left distal ureter, where the calculus was encountered. The calculus was then fragmented using a 365 micrometer holmium laser fiber into multiple smaller fragments, which were grasped individually with a 0-tip nitinol stone basket and deposited in the bladder. The ureteroscope was advanced up the proximal ureter. There were no other stone fragments appreciated. The ureteroscope was then slowly withdrawn, reexamining the entire ureter on the way out. There was edema and erythema where the calculus had been obstructing, but no sign of any significant ureteral trauma, and no remaining ureteral calculus fragments. The cystoscope was backloaded over the safety Glidewire and a 4.8-Burundian x 28 cm left ureteral stent was placed in conventional fashion, was seen to coil within the left renal pelvis under fluoroscopic guidance, and within the bladder under cystoscopic guidance. The bladder was drained and the calculus fragments were collected and will be sent for analysis. The cystoscope was removed. A full-length suture dangler was left affixed the stent within the bladder, which was then affixed to the dorsum of the penis externally using Mastisol and small Tegaderm. Lidocaine 2% jelly was placed in the urethra, and the procedure was terminated. The patient tolerated the procedure well, was extubated in the operating room and transferred to the postanesthesia care unit in stable condition with no immediately apparent complications. We will arrange a visit  Friday for string stent removal and then await the stone analysis results for further recommendations.      Eugene Burch D.O.  [facility]

## 2024-03-06 NOTE — ANESTHESIA PROCEDURE NOTES
Airway  Date/Time: 3/5/2024 6:55 PM  Urgency: elective    Airway not difficult    General Information and Staff    Patient location during procedure: OR  Anesthesiologist: Tank Pereira MD  Performed: anesthesiologist   Performed by: Tank Pereira MD  Authorized by: Tank Pereira MD      Indications and Patient Condition  Indications for airway management: anesthesia  Sedation level: deep  Preoxygenated: yes  Patient position: sniffing  Mask difficulty assessment: 1 - vent by mask    Final Airway Details  Final airway type: supraglottic airway      Successful airway: classic  Size 3       Number of attempts at approach: 1  Number of other approaches attempted: 0

## 2024-03-06 NOTE — DISCHARGE INSTRUCTIONS
Expect a variable degree of hematuria, urinary frequency and urgency.  Expect mild left flank pain during urination.  Do not manipulate string affixed to penis.  Drink plenty of water and avoid constipation.  Call if fever, passing large blood clots, severe pain, questions or concerns.    [facility]    3/5/2024      General Recommendations to Avoid Future Kidney Stones    1. Drink enough water to produce 2 liters of clear urine daily. You may need to use a container at first to measure how much you are actually producing and increase your intake accordingly. Try to start the day off with a large glass of water as we all wake up dehydrated in the morning.    2. Add lemon or lime juice to your water. These juices contain citrate which naturally inhibits stone formation. An easy way to do this is using sugar free lemonade mix (ie Crystal Light or True Lemon (if you prefer to avoid aspartame))    3. Avoid salty foods such as prepackaged and fast foods, and do not add salt to foods.    4. Limit intake of the following group of foods to one serving daily: spinach, nuts (especially almonds), tea (especially black tea), chocolate, and potatoes.    5. Limit intake of Vitamin C supplements to less than 1000 mg daily.    6. Limit intake of animal protein (beef, chicken, turkey, fish, pork) to one serving daily. A good rule for portion size is no more meat than will fit in the palm of your hand.    7. Maintain 1-2 servings of dairy products daily (1 serving = 1 glass of milk, 2 slices of cheese, 1 scoop of ice cream, or 1 cup of yogurt). Try to decrease cheese intake as it may increase kidney stone risk compared to other dairy products. Do not eliminate calcium from your diet as it is necessary for bone health.    8. Eat a low fat diet and exercise at least 30 minutes 3 times per week to try and maintain your ideal body weight. Being overweight is a risk factor for kidney stones too!      You received a drug  called Toradol which is an Anti Inflammatory at: 7:40 pm     Do not take any Anti Inflammatory like Motrin, Advil, Aleve or Ibuprofen until after: 1:40 am   Please report any suspected allergic reactions or bleeding issues to your doctor

## 2024-03-13 LAB
CAOX DIHYDRATE: 20 %
CAOX MONOHYDRATE: 70 %
HYDROXYAPATITE: 10 %
WEIGHT-STONE: 9 MG

## (undated) DEVICE — SOLUTION IRRIG 3000ML 0.9% NACL FLX CONT

## (undated) DEVICE — HYDROGEL COATED URETERAL DILATOR: Brand: NOTTINGHAM ONE-STEP

## (undated) DEVICE — ADHESIVE LIQ 2/3ML VI MASTISOL

## (undated) DEVICE — SYRINGE MED 20ML STD CLR PLAS LL TIP N CTRL

## (undated) DEVICE — ZIPWIRE GUIDEWIRE .035X150 STR

## (undated) DEVICE — FIBER LSR 365UM 6J 80HZ 120W DL FOR LITHO

## (undated) DEVICE — CYSTO CDS-LF: Brand: MEDLINE INDUSTRIES, INC.

## (undated) DEVICE — 3M™ TEGADERM™ TRANSPARENT FILM DRESSING FRAME STYLE, 1624W, US-VER, 100/CARTON 4 CARTONS/CASE: Brand: 3M™ TEGADERM™

## (undated) DEVICE — SLEEVE COMPR M KNEE LEN SGL USE KENDALL SCD

## (undated) DEVICE — NITINOL STONE RETRIEVAL BASKET: Brand: ZERO TIP

## (undated) DEVICE — SKN PREP SPNG STKS PVP PNT STR: Brand: MEDLINE INDUSTRIES, INC.

## (undated) DEVICE — GLOVE SURGICAL 7.5 SENSICARE P

## (undated) NOTE — LETTER
[address]  [address]  Authorization for Surgical Operation and Procedure     Date: 3/5/2024_                                                                                                         Time:_10:14_  I hereby authorize Dr. TERRY Burch, my physician and his/her assistants (if applicable), which may include medical students, residents, and/or fellows, to perform the following surgical operation/ procedure and administer such anesthesia as may be determined necessary by my physician:  CYSTOSCOPY, LEFT URETEROSCOPY, [id number] LASER LITHOTRIPSY, STONE EXTRACTION, POSSIBLE URETER STENT on Juan Antonio Andrews   2.   I recognize that during the surgical operation/procedure, unforeseen conditions may necessitate additional or different procedures than those listed above.  I, therefore, further authorize and request that the above-named surgeon, assistants, or designees perform such procedures as are, in their judgment, necessary and desirable.    3.   My surgeon/physician has discussed prior to my surgery the potential benefits, risks and side effects of this procedure; the likelihood of achieving goals; and potential problems that might occur during recuperation.  They also discussed reasonable alternatives to the procedure, including risks, benefits, and side effects related to the alternatives and risks related to not receiving this procedure.  I have had all my questions answered and I acknowledge that no guarantee has been made as to the result that may be obtained.    4.   Should the need arise during my operation/procedure, which includes change of level of care prior to discharge, I also consent to the administration of blood and/or blood products.  Further, I understand that despite careful testing and screening of blood or blood products by collecting agencies, I may still be subject to ill effects as a result of receiving a blood transfusion and/or blood  products.  The following are some, but not all, of the potential risks that can occur: fever and allergic reactions, hemolytic reactions, transmission of diseases such as Hepatitis, AIDS and Cytomegalovirus (CMV) and fluid overload.  In the event that I wish to have an autologous transfusion of my own blood, or a directed donor transfusion, I will discuss this with my physician.  Check only if Refusing Blood or Blood Products  I understand refusal of blood or blood products as deemed necessary by my physician may have serious consequences to my condition to include possible death. I hereby assume responsibility for my refusal and release the hospital, its personnel, and my physicians from any responsibility for the consequences of my refusal.          o  Refuse      5.   I authorize the use of any specimen, organs, tissues, body parts or foreign objects that may be removed from my body during the operation/procedure for diagnosis, research or teaching purposes and their subsequent disposal by hospital authorities.  I also authorize the release of specimen test results and/or written reports to my treating physician on the hospital medical staff or other referring or consulting physicians involved in my care, at the discretion of the Pathologist or my treating physician.    6.   I consent to the photographing or videotaping of the operations or procedures to be performed, including appropriate portions of my body for medical, scientific, or educational purposes, provided my identity is not revealed by the pictures or by descriptive texts accompanying them.  If the procedure has been photographed/videotaped, the surgeon will obtain the original picture, image, videotape or CD.  The hospital will not be responsible for storage, release or maintenance of the picture, image, tape or CD.    7.   I consent to the presence of a  or observers in the operating room as deemed necessary by my physician or  their designees.    8.   I recognize that in the event my procedure results in extended X-Ray/fluoroscopy time, I may develop a skin reaction.    9. If I have a Do Not Attempt Resuscitation (DNAR) order in place, that status will be suspended while in the operating room, procedural suite, and during the recovery period unless otherwise explicitly stated by me (or a person authorized to consent on my behalf). The surgeon or my attending physician will determine when the applicable recovery period ends for purposes of reinstating the DNAR order.  10. Patients having a sterilization procedure: I understand that if the procedure is successful the results will be permanent and it will therefore be impossible for me to inseminate, conceive, or bear children.  I also understand that the procedure is intended to result in sterility, although the result has not been guaranteed.   11. I acknowledge that my physician has explained sedation/analgesia administration to me including the risk and benefits I consent to the administration of sedation/analgesia as may be necessary or desirable in the judgment of my physician.    I CERTIFY THAT I HAVE READ AND FULLY UNDERSTAND THE ABOVE CONSENT TO OPERATION and/or OTHER PROCEDURE.    _________________________________________  __________________________________  Signature of Patient     Signature of Responsible Person         ___________________________________         Printed Name of Responsible Person           _________________________________                 Relationship to Patient  _________________________________________  ______________________________  Signature of Witness          Date  Time      Patient Name: Juan Antonio Reillyrebecca     : 1980                 Printed: 2024     Medical Record #: [id number]                     Page [address] of [address] [place] [address]  [id number]    Consent for Anesthesia    I,  Juan Antonio Andrews agree to be cared for by an anesthesiologist, who is specially trained to monitor me and give me medicine to put me to sleep or keep me comfortable during my procedure    I understand that my anesthesiologist is not an employee or agent of [facility] [employer]. He or she works for [email or web address].    As the patient asking for anesthesia services, I agree to:  Allow the anesthesiologist (anesthesia doctor) to give me medicine and do additional procedures as necessary. Some examples are: Starting or using an “IV” to give me medicine, fluids or blood during my procedure, and having a breathing tube placed to help me breathe when I’m asleep (intubation). In the event that my heart stops working properly, I understand that my anesthesiologist will make every effort to sustain my life, unless otherwise directed by [facility] Do Not Resuscitate documents.  Tell my anesthesia doctor before my procedure:  If I am pregnant.  The last time that I ate or drank.  All of the medicines I take (including prescriptions, herbal supplements, and pills I can buy without a prescription (including street drugs/illegal medications). Failure to inform my anesthesiologist about these medicines may increase my risk of anesthetic complications.  If I am allergic to anything or have had a reaction to anesthesia before.  I understand how the anesthesia medicine will help me (benefits).  I understand that with any type of anesthesia medicine there are risks:  The most common risks are: nausea, vomiting, sore throat, muscle soreness, damage to my eyes, mouth, or teeth (from breathing tube placement).  Rare risks include: remembering what happened during my procedure, allergic reactions to medications, injury to my airway, heart, lungs, vision, nerves, or muscles and in extremely rare instances death.  My doctor has explained to me other choices available to me for my care  (alternatives).  Pregnant Patients (“epidural”):  I understand that the risks of having an epidural (medicine given into my back to help control pain during labor), include itching, low blood pressure, difficulty urinating, headache or slowing of the baby’s heart. Very rare risks include infection, bleeding, seizure, irregular heart rhythms and nerve injury.  Regional Anesthesia (“spinal”, “epidural”, & “nerve blocks”):  I understand that rare but potential complications include headache, bleeding, infection, seizure, irregular heart rhythms, and nerve injury.    I can change my mind about having anesthesia services at any time before I get the medicine.    _____________________________________________________________________________  Patient (or Representative) Signature/Relationship to Patient  Date   Time    _____________________________________________________________________________   Name (if used)    Language/Organization   Time    _____________________________________________________________________________  Anesthesiologist Signature     Date   Time  I have discussed the procedure and information above with the patient (or patient’s representative) and answered their questions. The patient or their representative has agreed to have anesthesia services.    _____________________________________________________________________________  Witness        Date   Time  I have verified that the signature is that of the patient or patient’s representative, and that it was signed before the procedure  Patient Name: Juan Antonio Andrews     : 1980                 Printed: 2024     Medical Record #: [id number]                     Page 2 of 2